# Patient Record
Sex: FEMALE | Race: WHITE | NOT HISPANIC OR LATINO | Employment: OTHER | URBAN - METROPOLITAN AREA
[De-identification: names, ages, dates, MRNs, and addresses within clinical notes are randomized per-mention and may not be internally consistent; named-entity substitution may affect disease eponyms.]

---

## 2022-06-24 PROBLEM — D86.9 SARCOIDOSIS: Status: ACTIVE | Noted: 2022-06-24

## 2024-05-27 ENCOUNTER — HOSPITAL ENCOUNTER (OUTPATIENT)
Dept: TELEMEDICINE | Facility: HOSPITAL | Age: 69
Discharge: HOME | End: 2024-05-27

## 2024-05-27 NOTE — PLAN OF CARE
Cart is not online to perform telestroke consult.   [facility] informed.     Melissa Chun MD  Vascular Neurology

## 2024-05-28 PROBLEM — I63.9 ISCHEMIC STROKE: Status: ACTIVE | Noted: 2024-05-28

## 2024-05-28 PROBLEM — I48.91 A-FIB: Status: ACTIVE | Noted: 2024-05-28

## 2024-05-28 PROBLEM — I63.511 ACUTE ISCHEMIC RIGHT MIDDLE CEREBRAL ARTERY (MCA) STROKE: Status: ACTIVE | Noted: 2024-05-28

## 2024-05-29 PROBLEM — I50.20 HFREF (HEART FAILURE WITH REDUCED EJECTION FRACTION): Status: ACTIVE | Noted: 2024-05-29

## 2024-05-31 PROBLEM — I67.89: Status: ACTIVE | Noted: 2024-05-31

## 2024-05-31 PROBLEM — Z95.818 PRESENCE OF WATCHMAN LEFT ATRIAL APPENDAGE CLOSURE DEVICE: Status: ACTIVE | Noted: 2024-05-31

## 2024-05-31 PROBLEM — R47.1 DYSARTHRIA DUE TO ACUTE CEREBROVASCULAR ACCIDENT (CVA): Status: ACTIVE | Noted: 2024-05-31

## 2024-05-31 PROBLEM — G81.04: Status: ACTIVE | Noted: 2024-05-31

## 2024-05-31 PROBLEM — I50.41 ACUTE COMBINED SYSTOLIC AND DIASTOLIC CONGESTIVE HEART FAILURE: Status: ACTIVE | Noted: 2024-05-31

## 2024-05-31 PROBLEM — I10 HYPERTENSION: Status: ACTIVE | Noted: 2024-05-31

## 2024-05-31 PROBLEM — I63.9 DYSARTHRIA DUE TO ACUTE CEREBROVASCULAR ACCIDENT (CVA): Status: ACTIVE | Noted: 2024-05-31

## 2024-06-02 PROBLEM — R74.8 ELEVATED ALKALINE PHOSPHATASE LEVEL: Status: ACTIVE | Noted: 2024-06-02

## 2024-06-02 PROBLEM — D72.829 LEUKOCYTOSIS: Status: ACTIVE | Noted: 2024-06-02

## 2024-06-15 PROBLEM — Q24.8 INTERATRIAL CARDIAC SHUNT: Status: ACTIVE | Noted: 2024-06-15

## 2024-06-15 PROBLEM — I50.22 CHRONIC SYSTOLIC HEART FAILURE: Status: ACTIVE | Noted: 2024-06-15
